# Patient Record
Sex: MALE | Race: WHITE
[De-identification: names, ages, dates, MRNs, and addresses within clinical notes are randomized per-mention and may not be internally consistent; named-entity substitution may affect disease eponyms.]

---

## 2018-06-24 ENCOUNTER — HOSPITAL ENCOUNTER (EMERGENCY)
Dept: HOSPITAL 54 - ER | Age: 68
Discharge: HOME | End: 2018-06-24
Payer: MEDICARE

## 2018-06-24 VITALS — BODY MASS INDEX: 23.54 KG/M2 | WEIGHT: 150 LBS | HEIGHT: 67 IN

## 2018-06-24 VITALS — DIASTOLIC BLOOD PRESSURE: 86 MMHG | SYSTOLIC BLOOD PRESSURE: 138 MMHG
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
## 2018-06-24 DIAGNOSIS — M25.512: ICD-10-CM

## 2018-06-24 DIAGNOSIS — M25.551: ICD-10-CM

## 2018-06-24 DIAGNOSIS — R51: ICD-10-CM

## 2018-06-24 DIAGNOSIS — M54.2: ICD-10-CM

## 2018-06-24 DIAGNOSIS — I10: ICD-10-CM
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
## 2018-06-24 DIAGNOSIS — S06.0X0A: Primary | ICD-10-CM

## 2018-06-24 DIAGNOSIS — R07.81: ICD-10-CM

## 2018-06-24 DIAGNOSIS — M62.838: ICD-10-CM

## 2018-06-24 DIAGNOSIS — Z98.890: ICD-10-CM

## 2018-06-24 DIAGNOSIS — J45.909: ICD-10-CM
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
## 2018-06-24 PROCEDURE — Z7610: HCPCS

## 2018-06-24 PROCEDURE — 70450 CT HEAD/BRAIN W/O DYE: CPT

## 2018-06-24 PROCEDURE — 73502 X-RAY EXAM HIP UNI 2-3 VIEWS: CPT

## 2018-06-24 PROCEDURE — A4606 OXYGEN PROBE USED W OXIMETER: HCPCS

## 2018-06-24 PROCEDURE — 99284 EMERGENCY DEPT VISIT MOD MDM: CPT

## 2018-06-24 PROCEDURE — 71111 X-RAY EXAM RIBS/CHEST4/> VWS: CPT

## 2018-06-24 PROCEDURE — 73030 X-RAY EXAM OF SHOULDER: CPT

## 2018-06-24 PROCEDURE — L0172 CERV COL SR FOAM 2PC PRE OTS: HCPCS

## 2018-06-24 PROCEDURE — 72125 CT NECK SPINE W/O DYE: CPT
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
